# Patient Record
Sex: FEMALE | Race: BLACK OR AFRICAN AMERICAN | HISPANIC OR LATINO | URBAN - METROPOLITAN AREA
[De-identification: names, ages, dates, MRNs, and addresses within clinical notes are randomized per-mention and may not be internally consistent; named-entity substitution may affect disease eponyms.]

---

## 2023-09-19 PROBLEM — Z3A.39 39 WEEKS GESTATION OF PREGNANCY: Status: ACTIVE | Noted: 2023-08-14

## 2023-09-27 ENCOUNTER — ROUTINE PRENATAL (OUTPATIENT)
Dept: OBGYN CLINIC | Facility: CLINIC | Age: 23
End: 2023-09-27

## 2023-09-27 ENCOUNTER — HOSPITAL ENCOUNTER (INPATIENT)
Facility: HOSPITAL | Age: 23
LOS: 3 days | Discharge: HOME | End: 2023-09-30
Attending: OBSTETRICS & GYNECOLOGY | Admitting: OBSTETRICS & GYNECOLOGY
Payer: COMMERCIAL

## 2023-09-27 VITALS
WEIGHT: 146.8 LBS | HEART RATE: 106 BPM | SYSTOLIC BLOOD PRESSURE: 114 MMHG | BODY MASS INDEX: 25.2 KG/M2 | DIASTOLIC BLOOD PRESSURE: 77 MMHG

## 2023-09-27 DIAGNOSIS — O36.8130 DECREASED FETAL MOVEMENTS IN THIRD TRIMESTER, SINGLE OR UNSPECIFIED FETUS: ICD-10-CM

## 2023-09-27 DIAGNOSIS — Z3A.40 40 WEEKS GESTATION OF PREGNANCY: Primary | ICD-10-CM

## 2023-09-27 DIAGNOSIS — Z3A.40 40 WEEKS GESTATION OF PREGNANCY: ICD-10-CM

## 2023-09-27 DIAGNOSIS — Z34.93 PRENATAL CARE IN THIRD TRIMESTER: Primary | ICD-10-CM

## 2023-09-27 PROBLEM — Z34.90 ENCOUNTER FOR ELECTIVE INDUCTION OF LABOR: Status: ACTIVE | Noted: 2023-09-27

## 2023-09-27 LAB
ABO GROUP BLD: NORMAL
BLD GP AB SCN SERPL QL: NEGATIVE
ERYTHROCYTE [DISTWIDTH] IN BLOOD BY AUTOMATED COUNT: 14.6 % (ref 11.6–15.1)
HCT VFR BLD AUTO: 41.4 % (ref 34.8–46.1)
HGB BLD-MCNC: 13.4 G/DL (ref 11.5–15.4)
HOLD SPECIMEN: YES
MCH RBC QN AUTO: 28.8 PG (ref 26.8–34.3)
MCHC RBC AUTO-ENTMCNC: 32.4 G/DL (ref 31.4–37.4)
MCV RBC AUTO: 89 FL (ref 82–98)
PLATELET # BLD AUTO: 219 THOUSANDS/UL (ref 149–390)
PMV BLD AUTO: 11.5 FL (ref 8.9–12.7)
RBC # BLD AUTO: 4.66 MILLION/UL (ref 3.81–5.12)
RH BLD: POSITIVE
SPECIMEN EXPIRATION DATE: NORMAL
WBC # BLD AUTO: 7.62 THOUSAND/UL (ref 4.31–10.16)

## 2023-09-27 PROCEDURE — 99213 OFFICE O/P EST LOW 20 MIN: CPT | Performed by: OBSTETRICS & GYNECOLOGY

## 2023-09-27 PROCEDURE — 86900 BLOOD TYPING SEROLOGIC ABO: CPT

## 2023-09-27 PROCEDURE — 86850 RBC ANTIBODY SCREEN: CPT

## 2023-09-27 PROCEDURE — 86901 BLOOD TYPING SEROLOGIC RH(D): CPT

## 2023-09-27 PROCEDURE — 99202 OFFICE O/P NEW SF 15 MIN: CPT

## 2023-09-27 PROCEDURE — 85027 COMPLETE CBC AUTOMATED: CPT

## 2023-09-27 PROCEDURE — 86780 TREPONEMA PALLIDUM: CPT

## 2023-09-27 PROCEDURE — 4A1HXCZ MONITORING OF PRODUCTS OF CONCEPTION, CARDIAC RATE, EXTERNAL APPROACH: ICD-10-PCS | Performed by: OBSTETRICS & GYNECOLOGY

## 2023-09-27 PROCEDURE — NC001 PR NO CHARGE: Performed by: OBSTETRICS & GYNECOLOGY

## 2023-09-27 RX ORDER — MORPHINE SULFATE 10 MG/ML
INJECTION, SOLUTION INTRAMUSCULAR; INTRAVENOUS EVERY 4 HOURS PRN
Status: CANCELLED | OUTPATIENT
Start: 2023-09-27

## 2023-09-27 RX ORDER — SODIUM CHLORIDE, SODIUM LACTATE, POTASSIUM CHLORIDE, CALCIUM CHLORIDE 600; 310; 30; 20 MG/100ML; MG/100ML; MG/100ML; MG/100ML
125 INJECTION, SOLUTION INTRAVENOUS CONTINUOUS
Status: DISCONTINUED | OUTPATIENT
Start: 2023-09-27 | End: 2023-09-28

## 2023-09-27 RX ORDER — ONDANSETRON 2 MG/ML
4 INJECTION INTRAMUSCULAR; INTRAVENOUS EVERY 6 HOURS PRN
Status: DISCONTINUED | OUTPATIENT
Start: 2023-09-27 | End: 2023-09-28

## 2023-09-27 RX ORDER — OXYTOCIN/RINGER'S LACTATE 30/500 ML
1-30 PLASTIC BAG, INJECTION (ML) INTRAVENOUS
Status: DISCONTINUED | OUTPATIENT
Start: 2023-09-28 | End: 2023-09-28

## 2023-09-27 RX ORDER — BUPIVACAINE HYDROCHLORIDE 2.5 MG/ML
30 INJECTION, SOLUTION EPIDURAL; INFILTRATION; INTRACAUDAL ONCE AS NEEDED
Status: DISCONTINUED | OUTPATIENT
Start: 2023-09-27 | End: 2023-09-28

## 2023-09-27 RX ADMIN — Medication 25 MCG: at 21:15

## 2023-09-27 RX ADMIN — SODIUM CHLORIDE, SODIUM LACTATE, POTASSIUM CHLORIDE, AND CALCIUM CHLORIDE 999 ML/HR: .6; .31; .03; .02 INJECTION, SOLUTION INTRAVENOUS at 22:46

## 2023-09-27 RX ADMIN — MORPHINE SULFATE 2 MG: 2 INJECTION, SOLUTION INTRAMUSCULAR; INTRAVENOUS at 21:31

## 2023-09-27 NOTE — ASSESSMENT & PLAN NOTE
• Routine postpartum care  • Encourage ambulation  • Encourage familial bonding  • Lactation support as needed  • Pain: Motrin/Tylenol around the clock, oxycodone if needed  • Postpartum Contraceptive plan: declines at this time

## 2023-09-27 NOTE — H&P
H & P- Obstetrics   Allyson Brooks 25 y.o. female MRN: [id number]  Unit/Bed#: L&D 321-01 Encounter: [id number]    Assessment: 25 y.o.  at 40w2d admitted for induction of labor secondary to decreased fetal movement and post dates . SVE: 1.5/70/-3  FHT: reactive   EFW: 33%ile ; Vertex confirmed by US  GBS status: negative    Postpartum contraception plan: undecided  Plan:   * Encounter for elective induction of labor  Assessment & Plan  Complaint of decreased fetal movement , post date 40w2d  Admit for induction   IV Fluids  Clear liquid diet   Labs: CBC, RPR, Type and Screen   Analgesia: maternal request  Management: Dinero balloon and cytotec    40 weeks gestation of pregnancy  Assessment & Plan  Continuous fetal monitoring   EFW 2236 grams - 4 lbs 15 oz (33%)           Discussed case and plan w/ Dr. Dhaval Sharp       Chief Complaint: decreased fetal movement     HPI: Allyson Brooks is a 25 y.o. Ashley Johnny with an PATRICIA of 2023, by Ultrasound at 40w2d who is being admitted for induction of labor due to decreased fetal movement  . She denies having uterine contractions, has no LOF, and reports no VB. She states she has felt good FM. Roldan Lopez Patient Active Problem List   Diagnosis   • 40 weeks gestation of pregnancy   • Encounter for supervision of normal pregnancy in third trimester   • Prenatal care in third trimester   • Supervision of normal pregnancy   • Encounter for elective induction of labor       Baby complications/comments: none    Review of Systems   Constitutional: Negative for chills and fever. Eyes: Negative for photophobia and visual disturbance. Respiratory: Negative for cough. Cardiovascular: Negative for chest pain and leg swelling. Gastrointestinal: Negative for abdominal pain, diarrhea, nausea and vomiting. Genitourinary: Negative for dysuria, vaginal bleeding and vaginal discharge. Neurological: Positive for headaches. Negative for dizziness.        OB Hx:  OB History    Para Term  AB Living   2 0 0 0 1 0   SAB IAB Ectopic Multiple Live Births   0 0 0 0 0      # Outcome Date GA Lbr Moy/2nd Weight Sex Delivery Anes PTL Lv   2 Current            1 AB 2017 4w0d              Past Medical Hx:  No past medical history on file. Past Surgical hx:  No past surgical history on file. Social Hx:  Alcohol use: no  Tobacco use: no  Other substance use: no    Other: none    No Known Allergies    Medications Prior to Admission   Medication   • Prenatal Vit-Fe Fumarate-FA (prenatal vitamin) 28-0.8 mg       Objective:  HR:  [] 95  BP: (114-124)/(77-87) 124/87  There is no height or weight on file to calculate BMI. Physical Exam:  Chaperone present   Physical Exam  Constitutional:       Appearance: Normal appearance. Genitourinary:      Vulva normal.   HENT:      Head: Normocephalic and atraumatic. Eyes:      Extraocular Movements: Extraocular movements intact. Cardiovascular:      Rate and Rhythm: Normal rate and regular rhythm. Heart sounds: Normal heart sounds. No murmur heard. No friction rub. No gallop. Pulmonary:      Effort: Pulmonary effort is normal. No respiratory distress. Breath sounds: No wheezing or rhonchi. Abdominal:      General: There is no distension. Palpations: Abdomen is soft. Tenderness: There is no abdominal tenderness. There is no guarding. Musculoskeletal:         General: No swelling or tenderness. Normal range of motion. Cervical back: Normal range of motion. Neurological:      Mental Status: She is alert. Mental status is at baseline. Skin:     General: Skin is warm. Findings: No rash.    Psychiatric:         Mood and Affect: Mood normal.            FHT:  Baseline Rate: 120 bpm  FHR Category: Category I    TOCO:   Contraction Duration (seconds): 0  Contraction Quality: Mild, Not applicable    Lab Results   Component Value Date    WBC 7.35 08/15/2023    HGB 11.8 08/15/2023    HCT 35.7 08/15/2023     08/15/2023     No results found for: "NA", "K", "CL", "CO2", "BUN", "CREATININE", "GLUCOSE", "AST", "ALT"  Prenatal Labs: Reviewed      Blood type: O positive   Antibody: negative  GBS: negative   HIV: nonreactive   Rubella: Immune  VDRL/RPR: Non reactive  HBsAg: Negative  Chlamydia: Negative  Gonorrhea: Negative  Diabetes 1 hour screen: 105  3 hour glucose: NA  Platelets: 048   Hgb: 11.8  >2 Midnights  INPATIENT     Signature/Title: Evelyne Jiang MD  Date: 9/27/2023  Time: 7:52 PM

## 2023-09-27 NOTE — PROGRESS NOTES
[facility] [place]  Name: Leroy Cid  MRN: [id number]  : 2000      ASSESSMENT/PLAN:  Problem List        Other    40 weeks gestation of pregnancy    Overview     Late transfer of [facility] from [facility]   Prenatal labs wnl  EFW 8/15/23: 2236g 33%ile   GC/CT negative   Pap Negative   Contraception- unsure, "I am a single mother"   GBS collected 23  Rh pos: rhogam not indicated  Delivery plan: awaiting labor           Encounter for supervision of normal pregnancy in third trimester    Prenatal care in third trimester    Supervision of normal pregnancy   Other Visit Diagnoses     Decreased fetal movements in third trimester, single or unspecified fetus              SUBJECTIVE 25 y.o. Ruth Le @ 40w2d here for PN visit. She denies contractions. She denies leakage of fluid and vaginal bleeding. She states she has had decreased fetal  Movement throughout the day today. Her pregnancy is uncomplicated. OBJECTIVE:  Vitals:    23 1635   BP: 114/77   Pulse: (!) 106       FHT: 120s by doppler   SVE:  1.5/70/-3, membrane sweep performed    Advised patient to present to [facility] L&D for NST/CHINEDU and possible IOL. She agreed and expressed understanding.     Jayna Cash MD  OB/GYN PGY-2  2023  5:18 PM

## 2023-09-28 ENCOUNTER — ANESTHESIA EVENT (INPATIENT)
Dept: ANESTHESIOLOGY | Facility: HOSPITAL | Age: 23
End: 2023-09-28
Payer: COMMERCIAL

## 2023-09-28 ENCOUNTER — ANESTHESIA (INPATIENT)
Dept: ANESTHESIOLOGY | Facility: HOSPITAL | Age: 23
End: 2023-09-28
Payer: COMMERCIAL

## 2023-09-28 PROBLEM — O41.1290 CHORIOAMNIONITIS: Status: ACTIVE | Noted: 2023-09-28

## 2023-09-28 LAB
BASE EXCESS BLDCOA CALC-SCNC: -7 MMOL/L (ref 3–11)
BASE EXCESS BLDCOV CALC-SCNC: -4.4 MMOL/L (ref 1–9)
HCO3 BLDCOA-SCNC: 21.3 MMOL/L (ref 17.3–27.3)
HCO3 BLDCOV-SCNC: 22 MMOL/L (ref 12.2–28.6)
O2 CT VFR BLDCOA CALC: 9.7 ML/DL
OXYHGB MFR BLDCOA: 43.4 %
OXYHGB MFR BLDCOV: 54.1 %
PCO2 BLDCOA: 53.3 MM[HG] (ref 30–60)
PCO2 BLDCOV: 45 MM HG (ref 27–43)
PH BLDCOA: 7.22 [PH] (ref 7.23–7.43)
PH BLDCOV: 7.31 [PH] (ref 7.19–7.49)
PO2 BLDCOA: 20.6 MM HG (ref 5–25)
PO2 BLDCOV: 21.4 MM HG (ref 15–45)
SAO2 % BLDCOV: 11.6 ML/DL
TREPONEMA PALLIDUM IGG+IGM AB [PRESENCE] IN SERUM OR PLASMA BY IMMUNOASSAY: NORMAL

## 2023-09-28 PROCEDURE — 3E0P7VZ INTRODUCTION OF HORMONE INTO FEMALE REPRODUCTIVE, VIA NATURAL OR ARTIFICIAL OPENING: ICD-10-PCS | Performed by: OBSTETRICS & GYNECOLOGY

## 2023-09-28 PROCEDURE — 59409 OBSTETRICAL CARE: CPT | Performed by: OBSTETRICS & GYNECOLOGY

## 2023-09-28 PROCEDURE — 82805 BLOOD GASES W/O2 SATURATION: CPT | Performed by: OBSTETRICS & GYNECOLOGY

## 2023-09-28 PROCEDURE — 10907ZC DRAINAGE OF AMNIOTIC FLUID, THERAPEUTIC FROM PRODUCTS OF CONCEPTION, VIA NATURAL OR ARTIFICIAL OPENING: ICD-10-PCS | Performed by: OBSTETRICS & GYNECOLOGY

## 2023-09-28 RX ORDER — ROPIVACAINE HYDROCHLORIDE 2 MG/ML
INJECTION, SOLUTION EPIDURAL; INFILTRATION; PERINEURAL CONTINUOUS PRN
Status: DISCONTINUED | OUTPATIENT
Start: 2023-09-28 | End: 2023-09-28 | Stop reason: HOSPADM

## 2023-09-28 RX ORDER — SIMETHICONE 80 MG
80 TABLET,CHEWABLE ORAL EVERY 6 HOURS PRN
Status: DISCONTINUED | OUTPATIENT
Start: 2023-09-28 | End: 2023-09-30 | Stop reason: HOSPADM

## 2023-09-28 RX ORDER — LIDOCAINE HYDROCHLORIDE AND EPINEPHRINE 15; 5 MG/ML; UG/ML
INJECTION, SOLUTION EPIDURAL
Status: COMPLETED | OUTPATIENT
Start: 2023-09-28 | End: 2023-09-28

## 2023-09-28 RX ORDER — ROPIVACAINE HYDROCHLORIDE 2 MG/ML
INJECTION, SOLUTION EPIDURAL; INFILTRATION; PERINEURAL AS NEEDED
Status: DISCONTINUED | OUTPATIENT
Start: 2023-09-28 | End: 2023-09-28 | Stop reason: HOSPADM

## 2023-09-28 RX ORDER — CALCIUM CARBONATE 500 MG/1
1000 TABLET, CHEWABLE ORAL DAILY PRN
Status: DISCONTINUED | OUTPATIENT
Start: 2023-09-28 | End: 2023-09-30 | Stop reason: HOSPADM

## 2023-09-28 RX ORDER — CALCIUM CARBONATE 500 MG/1
500 TABLET, CHEWABLE ORAL DAILY PRN
Status: DISCONTINUED | OUTPATIENT
Start: 2023-09-28 | End: 2023-09-30 | Stop reason: HOSPADM

## 2023-09-28 RX ORDER — DIAPER,BRIEF,INFANT-TODD,DISP
1 EACH MISCELLANEOUS DAILY PRN
Status: DISCONTINUED | OUTPATIENT
Start: 2023-09-28 | End: 2023-09-30 | Stop reason: HOSPADM

## 2023-09-28 RX ORDER — DIPHENHYDRAMINE HCL 25 MG
25 TABLET ORAL EVERY 6 HOURS PRN
Status: DISCONTINUED | OUTPATIENT
Start: 2023-09-28 | End: 2023-09-30 | Stop reason: HOSPADM

## 2023-09-28 RX ORDER — SIMETHICONE 80 MG
80 TABLET,CHEWABLE ORAL 4 TIMES DAILY PRN
Status: DISCONTINUED | OUTPATIENT
Start: 2023-09-28 | End: 2023-09-30 | Stop reason: HOSPADM

## 2023-09-28 RX ORDER — IBUPROFEN 600 MG/1
600 TABLET ORAL EVERY 6 HOURS
Status: DISCONTINUED | OUTPATIENT
Start: 2023-09-28 | End: 2023-09-30 | Stop reason: HOSPADM

## 2023-09-28 RX ORDER — IBUPROFEN 600 MG/1
600 TABLET ORAL EVERY 6 HOURS PRN
Status: DISCONTINUED | OUTPATIENT
Start: 2023-09-28 | End: 2023-09-28 | Stop reason: SDUPTHER

## 2023-09-28 RX ORDER — SENNOSIDES 8.6 MG
1 TABLET ORAL DAILY
Status: DISCONTINUED | OUTPATIENT
Start: 2023-09-29 | End: 2023-09-30 | Stop reason: HOSPADM

## 2023-09-28 RX ORDER — ACETAMINOPHEN 325 MG/1
650 TABLET ORAL EVERY 4 HOURS PRN
Status: DISCONTINUED | OUTPATIENT
Start: 2023-09-28 | End: 2023-09-30 | Stop reason: HOSPADM

## 2023-09-28 RX ORDER — ACETAMINOPHEN 325 MG/1
975 TABLET ORAL EVERY 6 HOURS PRN
Status: DISCONTINUED | OUTPATIENT
Start: 2023-09-28 | End: 2023-09-28

## 2023-09-28 RX ORDER — ONDANSETRON 2 MG/ML
4 INJECTION INTRAMUSCULAR; INTRAVENOUS EVERY 8 HOURS PRN
Status: DISCONTINUED | OUTPATIENT
Start: 2023-09-28 | End: 2023-09-30 | Stop reason: HOSPADM

## 2023-09-28 RX ADMIN — WITCH HAZEL 1 PAD: 500 SOLUTION RECTAL; TOPICAL at 19:30

## 2023-09-28 RX ADMIN — ROPIVACAINE HYDROCHLORIDE 8 ML/HR: 2 INJECTION, SOLUTION EPIDURAL; INFILTRATION at 05:50

## 2023-09-28 RX ADMIN — SODIUM CHLORIDE 3 G: 9 INJECTION, SOLUTION INTRAVENOUS at 08:28

## 2023-09-28 RX ADMIN — ACETAMINOPHEN 325MG 975 MG: 325 TABLET ORAL at 07:57

## 2023-09-28 RX ADMIN — IBUPROFEN 600 MG: 600 TABLET ORAL at 18:19

## 2023-09-28 RX ADMIN — ROPIVACAINE HYDROCHLORIDE 4 ML: 2 INJECTION, SOLUTION EPIDURAL; INFILTRATION; PERINEURAL at 05:45

## 2023-09-28 RX ADMIN — SODIUM CHLORIDE, SODIUM LACTATE, POTASSIUM CHLORIDE, AND CALCIUM CHLORIDE 999 ML/HR: .6; .31; .03; .02 INJECTION, SOLUTION INTRAVENOUS at 05:45

## 2023-09-28 RX ADMIN — ACETAMINOPHEN 325MG 975 MG: 325 TABLET ORAL at 16:59

## 2023-09-28 RX ADMIN — SODIUM CHLORIDE 3 G: 9 INJECTION, SOLUTION INTRAVENOUS at 14:25

## 2023-09-28 RX ADMIN — SODIUM CHLORIDE 3 G: 9 INJECTION, SOLUTION INTRAVENOUS at 21:26

## 2023-09-28 RX ADMIN — ROPIVACAINE HYDROCHLORIDE: 2 INJECTION, SOLUTION EPIDURAL; INFILTRATION at 14:30

## 2023-09-28 RX ADMIN — LIDOCAINE HYDROCHLORIDE AND EPINEPHRINE 3 ML: 15; 5 INJECTION, SOLUTION EPIDURAL at 05:38

## 2023-09-28 RX ADMIN — BENZOCAINE AND LEVOMENTHOL 1 APPLICATION: 200; 5 SPRAY TOPICAL at 19:30

## 2023-09-28 RX ADMIN — Medication 2 MILLI-UNITS/MIN: at 01:37

## 2023-09-28 RX ADMIN — SODIUM CHLORIDE, SODIUM LACTATE, POTASSIUM CHLORIDE, AND CALCIUM CHLORIDE 125 ML/HR: .6; .31; .03; .02 INJECTION, SOLUTION INTRAVENOUS at 15:39

## 2023-09-28 RX ADMIN — ROPIVACAINE HYDROCHLORIDE 4 ML: 2 INJECTION, SOLUTION EPIDURAL; INFILTRATION; PERINEURAL at 05:41

## 2023-09-28 RX ADMIN — ROPIVACAINE HYDROCHLORIDE: 2 INJECTION, SOLUTION EPIDURAL; INFILTRATION at 05:50

## 2023-09-28 RX ADMIN — SODIUM CHLORIDE, SODIUM LACTATE, POTASSIUM CHLORIDE, AND CALCIUM CHLORIDE 125 ML/HR: .6; .31; .03; .02 INJECTION, SOLUTION INTRAVENOUS at 07:26

## 2023-09-28 RX ADMIN — SODIUM CHLORIDE, SODIUM LACTATE, POTASSIUM CHLORIDE, AND CALCIUM CHLORIDE 999 ML/HR: .6; .31; .03; .02 INJECTION, SOLUTION INTRAVENOUS at 02:07

## 2023-09-28 NOTE — OB LABOR/OXYTOCIN SAFETY PROGRESS
Labor Progress Note - Amor Flores 25 y.o. female MRN: [id number]    Unit/Bed#: L&D 321-01 Encounter: [id number]       Contraction Frequency (minutes): 2-4  Contraction Quality: Mild  Tachysystole: No   Cervical Dilation: 3-4        Cervical Effacement: 70  Fetal Station: -3  Baseline Rate: 120 bpm  Fetal Heart Rate: 135 BPM  FHR Category: CAT 1               Vital Signs:   Vitals:    09/27/23 2206   BP: 106/58   Pulse: 76   Resp:    Temp:        Notes/comments:  Rosette dislodged. SVE as above. Plan to start pitocin titration 4 hrs post cytotec.          Marta Meza MD 9/27/2023 11:57 PM

## 2023-09-28 NOTE — OB LABOR/OXYTOCIN SAFETY PROGRESS
Oxytocin Safety Progress Check Note - Jacky Booker 25 y.o. female MRN: [id number]    Unit/Bed#: L&D 321-01 Encounter: [id number]    Dose (kisha-units/min) Oxytocin: 8 kisha-units/min  Contraction Frequency (minutes): irregular  Contraction Quality: Mild  Tachysystole: No   Cervical Dilation: 4        Cervical Effacement: 70  Fetal Station: -3  Baseline Rate: 130 bpm  Fetal Heart Rate: 135 BPM  FHR Category: CAT I                Vital Signs:   Vitals:    09/28/23 0308   BP: 113/67   Pulse: 80   Resp:    Temp:        Notes/comments:SVE as above. Fetal head ballotable. Patient is unsure about epidural. Plan to continue pitocin titrations and recheck in 2 hours with plan to AROM if fetal head is well applied.          Claudia Aguirre MD 9/28/2023 4:28 AM

## 2023-09-28 NOTE — OB LABOR/OXYTOCIN SAFETY PROGRESS
Oxytocin Safety Progress Check Note - Otis Brown 25 y.o. female MRN: [id number]    Unit/Bed#: L&D 321-01 Encounter: [id number]          Contraction Quality: Mild, Not applicable      Cervical Dilation: 1-2        Cervical Effacement: 70  Fetal Station: -3  Baseline Rate: 120 bpm  Fetal Heart Rate: 132 BPM  FHR Category: CAT I                Vital Signs:   Vitals:    09/27/23 1900   BP: 124/87   Pulse: 95   Resp: 16   Temp: 97.7 °F (36.5 °C)       Notes/comments: PROCEDURE:  GAGNON BALLOON PLACEMENT    A 24F gagnon with a 30cc balloon was selected, a speculum examination was performed and the cervix was located. A gagnon balloon was introduced over sterile gloved hands. Balloon advanced through cervix with a ringed forceps beyond the internal cervical os. A small amount amount of sterile saline solution was instilled in the balloon to confirm placement. Placement was confirmed to be beyond the internal cervical os. A total of 60cc of sterile saline solution was placed into the balloon. Vaginal cytotec was placed. Pt tolerated well. Instructions left with RN to place gagnon to gravity with a 1L bag of IV fluid. Notify DO/MD when gagnon dislodged.     MD Nasreen Augustin MD 9/27/2023 9:19 PM

## 2023-09-28 NOTE — OB LABOR/OXYTOCIN SAFETY PROGRESS
Oxytocin Safety Progress Check Note - Imelda Enrique 25 y.o. female MRN: [id number]    Unit/Bed#: L&D 321-01 Encounter: [id number]    Dose (kisha-units/min) Oxytocin: 14 kisha-units/min  Contraction Frequency (minutes): 2-4  Contraction Quality: Moderate  Tachysystole: No   Cervical Dilation: 7        Cervical Effacement: 80  Fetal Station: -1  Baseline Rate: 140 bpm  Fetal Heart Rate: 135 BPM  FHR Category: cat 1               Vital Signs:   Vitals:    23 1200   BP: 112/55   Pulse: 84   Resp:    Temp:        Notes/comments:     SVE as above. No fevers, maternal or fetal tachycardia since starting fluids, tylenol and unasyn. Patient making change, pitocin at 16. Will continue to anticipate .      Placido Madrid DO 2023 12:30 PM

## 2023-09-28 NOTE — L&D DELIVERY NOTE
CHIEF COMPLAINT:   Patient presents with:  Sinus Problem    HPI:   Bel Elmore is a 46year old female who presents for sinus congestion for  2  weeks. Symptoms have been constant since onset.  Sinus congestion/pain is described as a pressure and is locat Vaginal Delivery Summary - OB/GYN   Leroy Mcgee 25 y.o. female MRN: [id number]  Unit/Bed#: L&D 321-01 Encounter: [id number]    Pre-delivery Diagnosis:   Pregnancy at 40w3d  Intrapartum Intraamniotic Infection    Post-delivery Diagnosis: same, delivered    Procedure: Spontaneous Vaginal Delivery     Attending Physician: Steve Monsalve MD  Resident Physician: Dr. Case Montes PGY-1      Anesthesia: Epidural    QBL: 152GE    Complications: none apparent    Specimens:   1. Arterial and venous cord gases  2. Cord blood  3. Segment of umbilical cord  4. Placenta to storage     Findings:  1. Viable female on 2023 at [phone], with APGARS of 9 and 9 at 1 and 5 minutes respectively. Weight pending at time of dictation for skin to skin bonding. 2. Spontaneous delivery of intact placenta at 1619  3. No lacerations    Gases:  Umbilical Artery  Recent Labs     23  1617   PHCART 7.220*   BECART -7.0*       Umbilical Vein  Recent Labs     23  1617   PHCVEN 7.307   BECVEN -4.4*           Brief history and labor course:  Leroy Mcgee is a 25 y.o. [id number] 40w3d . She presented to labor and delivery for decreased fetal movement and was kept for induction at term in the context of decreased fetal movement. Her pregnancy was uncomplicated. On exam in triage she was noted to be 1.5/70/-3. She was admitted for induction of labor. She was induced with a Dinero balloon and Cytotec. After the Dinero balloon came out she developed a maternal fever of 100.4, maternal tachycardia, and fetal tachycardia. Unasyn was started and Tylenol was given, while fluids were bolused. Her fever resolved as well as her tachycardia and fetal tachycardia. After this episode the strip remained category 1. Amniotomy was performed at [phone] for odorless, clear fluid. Description of delivery:    Patient delivered a viable female , wt pending as mother is doing skin to skin bonding. The fetal vertex delivered ELINA position spontaneously. /60 mmHg  Pulse 113  Temp(Src) 98.2 °F (36.8 °C) (Oral)  Resp 12  Ht 64\"  Wt 400 lb  BMI 68.63 kg/m2  SpO2 98%  GENERAL: well developed, well nourished,in no apparent distress  SKIN: no rashes,no suspicious lesions  HEAD: atraumatic, normocephalic, There was no nuchal cord. The left anterior shoulder delivered atraumatically with maternal expulsive forces and the assistance of gentle downward traction. The right posterior shoulder delivered with maternal expulsive forces and the assistance of gentle upward traction. The remainder of the fetus delivered spontaneously. Upon delivery, the infant was placed on the mothers abdomen and the cord was doubly clamped and cut. Delayed cord clamping was achieved. The infant was noted to cry spontaneously and was moving all extremities appropriately. There was no evidence for injury. Awaiting nurse resuscitators evaluated the . Arterial and venous cord blood gases and cord blood was collected for analysis. These were promptly sent to the lab. In the immediate post-partum, active management of the 3rd stage of labor was performed with massage, the administration of 30 units of IV pitocin, and gentle traction on the umbilical cord. The placenta delivered spontaneously and was noted to have a centrally inserted 3 vessel cord. The placenta was sent to storage     Laceration Repair  The vagina, cervix, perineum, and rectum were inspected and there was noted to be no lacerations. At the conclusion of the procedure, all needle, sponge, and instrument counts were noted to be correct. Dr. Antonio Geronimo was present and participated in all key portions of the case.     Disposition:  The patient and the  both tolerated the procedure well and are recovering in labor and delivery room       Helen Soliz DO  OB/GYN PGY-1  2023  4:36 PM · Return to clinic or follow up with PCP for further evaluation if symptoms are not improved within 48-72 hours  · Go to ER if facial or periorbital swelling develops  · Please take all of your antibiotic as prescribed or the infection will be treated inef Sinuses are air-filled spaces in the skull behind the face. They are kept moist and clean by a lining of mucosa. Things such as pollen, smoke, and chemical fumes can irritate the mucosa. It can then swell up.  As a response to irritation, the mucosa makes m © 3500-3619 [address], [address] [place]. All rights reserved. This information is not intended as a substitute for professional medical care. Always follow your healthcare professional's instructions.

## 2023-09-28 NOTE — OB LABOR/OXYTOCIN SAFETY PROGRESS
Oxytocin Safety Progress Check Note - Swapna Treadwell 25 y.o. female MRN: [id number]    Unit/Bed#: L&D 321-01 Encounter: [id number]    Dose (kisha-units/min) Oxytocin: 10 kisha-units/min  Contraction Frequency (minutes): 2-4  Contraction Quality: Mild  Tachysystole: No   Cervical Dilation: 4-5        Cervical Effacement: 70  Fetal Station: -1  Baseline Rate: 135 bpm  Fetal Heart Rate: 135 BPM  FHR Category: CAT II               Vital Signs:   Vitals:    09/28/23 0612   BP: 108/59   Pulse: (!) 106   Resp:    Temp:        Notes/comments: Patient comfortable on epidural. Hypotension noted. Recurrent variables with rebeca of 60bpm on FHT. Patient repositioned and given fluid bolus. Return to baseline noted. Plan to continue to monitor FHT . SVE unchanged. If CAT I maintained, consider AROm.          Trent Rogders MD 9/28/2023 6:37 AM

## 2023-09-28 NOTE — ANESTHESIA POSTPROCEDURE EVALUATION
Post-Op Assessment Note    CV Status:  Stable  Pain Score: 1    Pain management: adequate     Mental Status:  Alert and awake   Hydration Status:  Euvolemic   PONV Controlled:  Controlled   Airway Patency:  Patent      Post Op Vitals Reviewed: Yes      Staff: Anesthesiologist     Post-op block assessment: catheter intact and no complications      No notable events documented.     /61 (09/28/23 1715)    Temp     Pulse 104 (09/28/23 1715)   Resp      SpO2

## 2023-09-28 NOTE — ANESTHESIA PREPROCEDURE EVALUATION
Procedure:  LABOR ANALGESIA    Relevant Problems   GYN   (+) 40 weeks gestation of pregnancy   (+) Encounter for supervision of normal pregnancy in third trimester   (+) Supervision of normal pregnancy        Physical Exam    Airway    Mallampati score: II  TM Distance: >3 FB  Neck ROM: full     Dental   No notable dental hx     Cardiovascular  Cardiovascular exam normal    Pulmonary  Pulmonary exam normal     Other Findings        Anesthesia Plan  ASA Score- 2     Anesthesia Type- epidural with ASA Monitors. Additional Monitors:   Airway Plan:           Plan Factors-    Chart reviewed. Existing labs reviewed. Patient summary reviewed. Induction-     Postoperative Plan-     Informed Consent- Anesthetic plan and risks discussed with patient.

## 2023-09-28 NOTE — OB LABOR/OXYTOCIN SAFETY PROGRESS
Oxytocin Safety Progress Check Note - Leonel Briseno 25 y.o. female MRN: [id number]    Unit/Bed#: L&D 321-01 Encounter: [id number]    Dose (kisha-units/min) Oxytocin: 10 kisha-units/min  Contraction Frequency (minutes): 2-4  Contraction Quality: Moderate  Tachysystole: No   Cervical Dilation: 5        Cervical Effacement: 70  Fetal Station: -1  Baseline Rate: 175 bpm  Fetal Heart Rate: 135 BPM  FHR Category: cat 2 for fetal tachycardia, reassured by moderate variability and accelerations. Vital Signs:   Vitals:    09/28/23 0815   BP: 120/57   Pulse: (!) 107   Resp:    Temp:        Notes/comments:     FHT cat 1. AROMcl. Patient with maternal fever and fetal tachycardia, unasyn started, tylenol given, fluids bolused.      Lenora Asif DO 9/28/2023 8:37 AM

## 2023-09-28 NOTE — OB LABOR/OXYTOCIN SAFETY PROGRESS
Oxytocin Safety Progress Check Note - Swapna Treadwell 25 y.o. female MRN: [id number]    Unit/Bed#: L&D 321-01 Encounter: [id number]    Dose (kisha-units/min) Oxytocin: 16 kisha-units/min  Contraction Frequency (minutes): 2-4  Contraction Quality: Moderate  Tachysystole: No   Cervical Dilation: Lip/rim (Comment)        Cervical Effacement: 100  Fetal Station: 0  Baseline Rate: 135 bpm  Fetal Heart Rate: 135 BPM  FHR Category: cat 2 for early decelerations, reassured by moderate variability and accels present               Vital Signs:   Vitals:    23 1445   BP: 111/67   Pulse: 93   Resp:    Temp:        Notes/comments:      as above. Will position patient with peanut ball, and re-evaluate shortly.      Petra Quezada DO 2023 3:09 PM

## 2023-09-28 NOTE — OB LABOR/OXYTOCIN SAFETY PROGRESS
Oxytocin Safety Progress Check Note - Kareem Ports 25 y.o. female MRN: [id number]    Unit/Bed#: L&D 321-01 Encounter: [id number]    Dose (kisha-units/min) Oxytocin: 16 kisha-units/min  Contraction Frequency (minutes): 2-4  Contraction Quality: Moderate  Tachysystole: No   Cervical Dilation: 10  Dilation Complete Date: 09/28/23  Dilation Complete Time: 1555  Cervical Effacement: 100  Fetal Station: 2  Baseline Rate: 135 bpm  Fetal Heart Rate: 135 BPM  FHR Category: cat 1               Vital Signs:   Vitals:    09/28/23 1445   BP: 111/67   Pulse: 93   Resp:    Temp:        Notes/comments:     Patient is complete and +2. Will begin pushing.      Marlee Pinedo DO 9/28/2023 3:55 PM

## 2023-09-28 NOTE — ANESTHESIA PROCEDURE NOTES
Epidural Block    Patient location during procedure: OB/L&D  Start time: 9/28/2023 5:38 AM  Reason for block: primary anesthetic  Staffing  Performed by: Valeri Montes DO  Authorized by: Valeri Montes DO    Preanesthetic Checklist  Completed: patient identified, IV checked, risks and benefits discussed, surgical consent, monitors and equipment checked, pre-op evaluation and timeout performed  Epidural  Patient position: sitting  Prep: Betadine  Sedation Level: no sedation  Patient monitoring: frequent blood pressure checks  Approach: midline  Location: lumbar, L3-4  Injection technique: LATRICE saline  Needle  Needle type: Tuohy   Needle gauge: 18 G  Needle insertion depth: 6 cm  Catheter type: multi-orifice  Catheter size: 20 G  Catheter at skin depth: 10 cm  Catheter securement method: stabilization device and clear occlusive dressing  Test dose: negativelidocaine-epinephrine (XYLOCAINE-MPF/EPINEPHRINE) 1.5 %-1:200,000 injection 3 mL - Epidural   3 mL - 9/28/2023 5:38:00 AM  Assessment  Sensory level: T10  Number of attempts: 1negative aspiration for CSF, negative aspiration for heme and no paresthesia on injection  patient tolerated the procedure well with no immediate complications

## 2023-09-28 NOTE — DISCHARGE SUMMARY
Discharge Summary - Olga Kimball 25 y.o. female MRN: [id number]    Unit/Bed#: L&D 321-01 Encounter: [id number]    Admission Date: 2023     Discharge Date: 23    Problem List:      Chorioamnionitis       OBGYN Practice: [facility] Course:   Olga Kimball is a 25 y.o. [id number] 40w3d . She presented to labor and delivery for decreased fetal movement and was kept for induction at term in the context of decreased fetal movement. Her pregnancy was uncomplicated. On exam in triage she was noted to be 1.5/70/-3. She was admitted for induction of labor. She was induced with a Dinero balloon and Cytotec. After the Dinero balloon came out she developed a maternal fever of 100.4, maternal tachycardia, and fetal tachycardia. Unasyn was started and Tylenol was given, while fluids were bolused. Her fever resolved as well as her tachycardia and fetal tachycardia. After this episode the strip remained category 1. Amniotomy was performed at [phone] for odorless, clear fluid.         Delivery Findings:  Reema delivered a viable female  on 2023  4:14 PM  via Vaginal, Spontaneous  . The delivery was uncomplicated    Baby's Weight: 2930 g (6 lb 7.4 oz)   Apgar scores: 9  and 9  at 1 and 5 minutes, respectively  Anesthesia: Epidural ,   QBL: Non-Surgical QBL (mL): 139         was transferred to  nursery. Patient tolerated the procedure well and was transferred to recovery in stable condition. Her post-partum course was uncomplicated. Her post-partum pain was well controlled with oral analgesics. On day of discharge she was ambulating, voiding spontaneously, tolerating oral intake and hemodynamically stable. Mom's blood type is O positive and  Rhogam was not given. She was discharged home on postpartum day #2 without complications.  Patient was instructed to follow up with her OB as an outpatient and was given appropriate warnings to call doctor or provider if she develops signs of infection or uncontrolled pain. Disposition: Home    Planned Readmission: No    Discharge Medications:   Please see AVS    Discharge instructions :   -Do not place anything (no partner, tampons or douche) in your vagina for 6 weeks. -You may walk for exercise for the first 6 weeks then gradually return to your usual activities.   -Please do not drive for 1 week if you have no stitches and for 2 weeks if you have stitches or underwent a  delivery.    -You may take baths or shower per your preference.   -Please look at your bust (breasts) in the mirror daily and call your doctor for redness or tenderness or increased warmth. - If you have had a  section please look at your incision daily as well and call provider for increasing redness or steady drainage from the incision.   -Please call your doctor's office if temperature > 100.4*F or 38* C, worsening pain or a foul discharge.     Follow Up:  - Follow up in 3 weeks for postpartum visit    Monica Rivero MD  Obstetrics & Gynecology PGY-3  2023  6:44 AM

## 2023-09-29 PROCEDURE — 99024 POSTOP FOLLOW-UP VISIT: CPT | Performed by: OBSTETRICS & GYNECOLOGY

## 2023-09-29 RX ADMIN — IBUPROFEN 600 MG: 600 TABLET ORAL at 06:40

## 2023-09-29 RX ADMIN — ACETAMINOPHEN 325MG 650 MG: 325 TABLET ORAL at 03:02

## 2023-09-29 RX ADMIN — IBUPROFEN 600 MG: 600 TABLET ORAL at 00:19

## 2023-09-29 RX ADMIN — IBUPROFEN 600 MG: 600 TABLET ORAL at 22:29

## 2023-09-29 NOTE — PLAN OF CARE
Problem: PAIN - ADULT  Goal: Verbalizes/displays adequate comfort level or baseline comfort level  Description: Interventions:  - Encourage patient to monitor pain and request assistance  - Assess pain using appropriate pain scale  - Administer analgesics based on type and severity of pain and evaluate response  - Implement non-pharmacological measures as appropriate and evaluate response  - Consider cultural and social influences on pain and pain management  - Notify physician/advanced practitioner if interventions unsuccessful or patient reports new pain  Outcome: Progressing     Problem: INFECTION - ADULT  Goal: Absence or prevention of progression during hospitalization  Description: INTERVENTIONS:  - Assess and monitor for signs and symptoms of infection  - Monitor lab/diagnostic results  - Monitor all insertion sites, i.e. indwelling lines, tubes, and drains  - Monitor endotracheal if appropriate and nasal secretions for changes in amount and color  - [place] appropriate cooling/warming therapies per order  - Administer medications as ordered  - Instruct and encourage patient and family to use good hand hygiene technique  - Identify and instruct in appropriate isolation precautions for identified infection/condition  Outcome: Progressing  Goal: Absence of fever/infection during neutropenic period  Description: INTERVENTIONS:  - Monitor WBC    Outcome: Progressing     Problem: SAFETY ADULT  Goal: Patient will remain free of falls  Description: INTERVENTIONS:  - Educate patient/family on patient safety including physical limitations  - Instruct patient to call for assistance with activity   - Consult OT/PT to assist with strengthening/mobility   - Keep Call bell within reach  - Keep bed low and locked with side rails adjusted as appropriate  - Keep care items and personal belongings within reach  - Initiate and maintain comfort rounds  - Make Fall Risk Sign visible to staff  - Apply yellow socks and bracelet for high fall risk patients  - Consider moving patient to room near nurses station  Outcome: Progressing  Goal: Maintain or return to baseline ADL function  Description: INTERVENTIONS:  -  Assess patient's ability to carry out ADLs; assess patient's baseline for ADL function and identify physical deficits which impact ability to perform ADLs (bathing, care of mouth/teeth, toileting, grooming, dressing, etc.)  - Assess/evaluate cause of self-care deficits   - Assess range of motion  - Assess patient's mobility; develop plan if impaired  - Assess patient's need for assistive devices and provide as appropriate  - Encourage maximum independence but intervene and supervise when necessary  - Involve family in performance of ADLs  - Assess for home care needs following discharge   - Consider OT consult to assist with ADL evaluation and planning for discharge  - Provide patient education as appropriate  Outcome: Progressing  Goal: Maintains/Returns to pre admission functional level  Description: INTERVENTIONS:  - Perform BMAT or MOVE assessment daily.   - Set and communicate daily mobility goal to care team and patient/family/caregiver. - Collaborate with rehabilitation services on mobility goals if consulted  - Out of bed for toileting  - Record patient progress and toleration of activity level   Outcome: Progressing     Problem: Knowledge Deficit  Goal: Patient/family/caregiver demonstrates understanding of disease process, treatment plan, medications, and discharge instructions  Description: Complete learning assessment and assess knowledge base.   Interventions:  - Provide teaching at level of understanding  - Provide teaching via preferred learning methods  Outcome: Progressing     Problem: DISCHARGE PLANNING  Goal: Discharge to home or other facility with appropriate resources  Description: INTERVENTIONS:  - Identify barriers to discharge w/patient and caregiver  - Arrange for needed discharge resources and transportation as appropriate  - Identify discharge learning needs (meds, wound care, etc.)  - Arrange for interpretive services to assist at discharge as needed  - Refer to Case Management Department for coordinating discharge planning if the patient needs post-hospital services based on physician/advanced practitioner order or complex needs related to functional status, cognitive ability, or social support system  Outcome: Progressing

## 2023-09-29 NOTE — CASE MANAGEMENT
CM consulted for recent moved from [facility] to care. CM m/w MOB to complete an assessment. Interpretor [id number]    MOB is Socorro Rachel  BERT is not involved and lives in [facility]. Confirmed address: [facility] [address] [facility], [address] [place] [address]    Confirmed telephone numbers: [phone]      Lives with: Anthony Rodríguez ([phone])  Support system: Aunt  Supplies: Has car seat, crib, clothes. MOB requested assistance with diapers and formula. CM provided a resource list for those items. MOB stated she can also reach out to her mother for assistance. Feeding: Breast and supplement with formula  Government assistance: [facility]   Childcare: Home with MOB  Work/school: Currently unemployed. Recently moved here from [facility]. Aunt and mother are financially assisting MOB. Rides/transport: sulema  Pediatrician: Julio  Prenatal Care: Limited due to recent move from [facility]. Mental Health: Denies  Drug History: Denies  Legal issues: Denies    MOB is uninsured. CM made a referral to [facility]. MOB is MA pending. There are no concerns identified at this time.

## 2023-09-29 NOTE — PROGRESS NOTES
Progress Note - OB/GYN  Amor Flores 25 y.o. female MRN: [id number]  Unit/Bed#: L&D 307-01 Encounter: [id number]    Assessment and Plan     Amor Flores is a patient of: [address] [place] [address]. She is PPD# 1 s/p  spontaneous vaginal delivery  Recovering well and is stable       *  (spontaneous vaginal delivery)  Assessment & Plan  • Routine postpartum care  • Encourage ambulation  • Encourage familial bonding  • Lactation support as needed  • Pain: Motrin/Tylenol around the clock, oxycodone if needed  • Postpartum Contraceptive plan: declines at this time          Chorioamnionitis  Assessment & Plan  Tmax: 100.9; with maternal and fetal tachycardia  -Given 3 total doses of Unasyn with resolution of maternal and fetal tachycardia and elevated temperatures  -Fluids, Tylenol given  -Last elevated temp on  @1622 -- afebrile since  -No subjective fevers/chills    40 weeks gestation of pregnancy  Assessment & Plan  Continuous fetal monitoring   EFW 2236 grams - 4 lbs 15 oz (33%)         Disposition    - Anticipate discharge home on PPD# 1-2      Subjective/Objective     Chief Complaint: Postpartum State     Subjective:    Amor Flores is PPD/POD#1 s/p  spontaneous vaginal delivery. She has no current complaints. Pain is well controlled. Patient is currently voiding. She is ambulating. Patient is not currently passing flatus and has had no bowel movement. She is tolerating PO, and denies nausea or vomitting. Patient denies fever, chills, chest pain, shortness of breath, or calf tenderness. Lochia is normal. She is  Breastfeeding and Bottle feeding. She is recovering well and is stable.        Vitals:   /64 (BP Location: Right arm)   Pulse 75   Temp 97.9 °F (36.6 °C) (Temporal)   Resp 16   Ht 5' 4" (1.626 m)   Wt 66.2 kg (146 lb)   SpO2 100%   Breastfeeding Yes   BMI 25.06 kg/m²       Intake/Output Summary (Last 24 hours) at 2023  Last data filed at 2023 2220  Gross per 24 hour   Intake --   Output 1939 ml   Net -1939 ml       Invasive Devices     Peripheral Intravenous Line  Duration           Peripheral IV 09/27/23 Right Forearm 1 day                Physical Exam:   GEN: Tre Haddad appears well, alert and oriented x 3, pleasant and cooperative   CARDIO: RRR, no murmurs or rubs  RESP:  CTAB, no wheezes or rales  ABDOMEN: soft, no tenderness, no distention, fundus @ -1  EXTREMITIES: SCDs on, non tender, no erythema, b/l Maria Esther's sign negative      Labs:     Hemoglobin   Date Value Ref Range Status   09/27/2023 13.4 11.5 - 15.4 g/dL Final   08/15/2023 11.8 11.5 - 15.4 g/dL Final     WBC   Date Value Ref Range Status   09/27/2023 7.62 4.31 - 10.16 Thousand/uL Final   08/15/2023 7.35 4.31 - 10.16 Thousand/uL Final     Platelets   Date Value Ref Range Status   09/27/2023 219 149 - 390 Thousands/uL Final   08/15/2023 224 149 - 390 Thousands/uL Final          Mina Umana DO  9/29/2023  6:43 AM

## 2023-09-29 NOTE — LACTATION NOTE
This note was copied from a baby's chart. CONSULT - LACTATION  Baby Girl (Ivette) [address] [facility] 1 days female MRN: [id number]    [facility] Room / Bed: L&D 307(N)/L&D 307(N) Encounter: [id number]    Maternal Information     MOTHER:  Reema Rodarte  Maternal Age: 25 y.o.   OB History: # 1 - Date: , Sex: None, Weight: None, GA: 4w0d, Delivery: None, Apgar1: None, Apgar5: None, Living: None, Birth Comments: None    # 2 - Date: 23, Sex: Female, Weight: 2930 g (6 lb 7.4 oz), GA: 40w3d, Delivery: Vaginal, Spontaneous, Apgar1: 9, Apgar5: 9, Living: Living, Birth Comments: None   Previouse breast reduction surgery? No    Lactation history:   Has patient previously breast fed: No   How long had patient previously breast fed:     Previous breast feeding complications:     History reviewed. No pertinent surgical history. Birth information:  YOB: 2023   Time of birth: 4:14 PM   Sex: female   Delivery type: Vaginal, Spontaneous   Birth Weight: 2930 g (6 lb 7.4 oz)   Percent of Weight Change: -1%     Gestational Age: 38w1d   [unfilled]    Assessment     Breast and nipple assessment: normal assessment     Assessment: sleepy    Feeding assessment: Encouraged to call for Lactation support for nursing at breast     LATCH:  Latch: Repeated attempts, hold nipple in mouth, stimulate to suck   Audible Swallowing: A few with stimulation   Type of Nipple: Everted (After stimulation)   Comfort (Breast/Nipple): Soft/non-tender   Hold (Positioning): Partial assist, teach one side, mother does other, staff holds   [facility] Score: 7          Feeding recommendations:  breast feed on demand     Met with mother to go over Ready, Set Baby and discharge breastfeeding booklet in [place] including the feeding log.  Emphasized 8 or more (12) feedings in a 24 hour period, what to expect for the number of diapers per day of life and the progression of properties of the  stooling pattern. List of reasons to call a lactation consultant. Feeding logs  Feeding cues  Hand expression  Baby's Second day (cluster feeding)  Breastfeeding and Your Lifestyle (Medications, Alcohol, Caffeine, Smoking, Street Drugs, Methadone)  First Two Weeks Survival Guide for Breastfeeding  Breast Changes  Physical Therapy  Storage and Handling of Breast milk  How to Keep Your Breast Pump Kit Clean  The Employed Breastfeeding Mother  Mixed feeding  Bottle feeding like breastfeeding (paced bottle feeding)  astfeeding and your lifestyle, storage and preparation of breast milk, how to keep you breast pump clean, the employed breastfeeding mother and paced bottle feeding handouts. Booklet included Breastfeeding Resources for after discharge including access to the number for the [address]. Met with mother to go over discharge breastfeeding booklet including the feeding log. Emphasized 8 or more (12) feedings in a 24 hour period, what to expect for the number of diapers per day of life and the progression of properties of the  stooling pattern. List of reasons to call a lactation consultant. Feeding logs  Feeding cues  Hand expression  Baby's Second day (cluster feeding)  Breastfeeding and Your Lifestyle (Medications, Alcohol, Caffeine, Smoking, Street Drugs, Methadone)  First Two Weeks Survival Guide for Breastfeeding  Breast Changes  Physical Therapy  Storage and Handling of Breast milk  How to Keep Your Breast Pump Kit Clean  The Employed Breastfeeding Mother  Mixed feeding  Bottle feeding like breastfeeding (paced bottle feeding)  astfeeding and your lifestyle, storage and preparation of breast milk, how to keep you breast pump clean, the employed breastfeeding mother and paced bottle feeding handouts. Booklet included Breastfeeding Resources for after discharge including access to the number for the [address]. Family support at bedside.     [employer] MOB  to call for assistance, questions and concerns. Extension number for inpatient lactation support provided.         Ana He RN 9/29/2023 2:48 PM

## 2023-09-29 NOTE — ASSESSMENT & PLAN NOTE
Tmax: 100.9; with maternal and fetal tachycardia  - s/p 3 total doses of Unasyn with resolution of maternal and fetal tachycardia and elevated temperatures  -Last elevated temp on 9/28 @1622 -- afebrile since  -No subjective fevers/chills

## 2023-09-29 NOTE — PLAN OF CARE
Problem: PAIN - ADULT  Goal: Verbalizes/displays adequate comfort level or baseline comfort level  Description: Interventions:  - Encourage patient to monitor pain and request assistance  - Assess pain using appropriate pain scale  - Administer analgesics based on type and severity of pain and evaluate response  - Implement non-pharmacological measures as appropriate and evaluate response  - Consider cultural and social influences on pain and pain management  - Notify physician/advanced practitioner if interventions unsuccessful or patient reports new pain  Outcome: Progressing     Problem: Knowledge Deficit  Goal: Patient/family/caregiver demonstrates understanding of disease process, treatment plan, medications, and discharge instructions  Description: Complete learning assessment and assess knowledge base.   Interventions:  - Provide teaching at level of understanding  - Provide teaching via preferred learning methods  Outcome: Progressing     Problem: DISCHARGE PLANNING  Goal: Discharge to home or other facility with appropriate resources  Description: INTERVENTIONS:  - Identify barriers to discharge w/patient and caregiver  - Arrange for needed discharge resources and transportation as appropriate  - Identify discharge learning needs (meds, wound care, etc.)  - Arrange for interpretive services to assist at discharge as needed  - Refer to Case Management Department for coordinating discharge planning if the patient needs post-hospital services based on physician/advanced practitioner order or complex needs related to functional status, cognitive ability, or social support system  Outcome: Progressing

## 2023-09-29 NOTE — NURSING NOTE
Maternal/ discharge teaching complete. Pt verbalized understanding and denies any questions at this time. Pt encouraged to call for nursing staff if any questions come to mind prior to discharge. Save your life magnet and education packet given and reviewed.  [id number].

## 2023-09-30 VITALS
WEIGHT: 146 LBS | HEIGHT: 64 IN | OXYGEN SATURATION: 100 % | RESPIRATION RATE: 18 BRPM | TEMPERATURE: 98.3 F | HEART RATE: 73 BPM | SYSTOLIC BLOOD PRESSURE: 117 MMHG | BODY MASS INDEX: 24.92 KG/M2 | DIASTOLIC BLOOD PRESSURE: 63 MMHG

## 2023-09-30 PROCEDURE — NC001 PR NO CHARGE: Performed by: OBSTETRICS & GYNECOLOGY

## 2023-09-30 PROCEDURE — 99024 POSTOP FOLLOW-UP VISIT: CPT | Performed by: OBSTETRICS & GYNECOLOGY

## 2023-09-30 RX ORDER — ACETAMINOPHEN 325 MG/1
650 TABLET ORAL EVERY 4 HOURS PRN
Qty: 30 TABLET | Refills: 0 | Status: SHIPPED | OUTPATIENT
Start: 2023-09-30 | End: 2023-10-30

## 2023-09-30 RX ORDER — IBUPROFEN 600 MG/1
600 TABLET ORAL EVERY 6 HOURS
Qty: 30 TABLET | Refills: 0 | Status: SHIPPED | OUTPATIENT
Start: 2023-09-30

## 2023-09-30 RX ORDER — POLYETHYLENE GLYCOL 3350 17 G/17G
17 POWDER, FOR SOLUTION ORAL DAILY
Qty: 10 EACH | Refills: 0 | Status: SHIPPED | OUTPATIENT
Start: 2023-09-30

## 2023-09-30 RX ADMIN — SENNOSIDES 8.6 MG: 8.6 TABLET, FILM COATED ORAL at 08:05

## 2023-09-30 RX ADMIN — IBUPROFEN 600 MG: 600 TABLET ORAL at 07:22

## 2023-09-30 RX ADMIN — IBUPROFEN 600 MG: 600 TABLET ORAL at 14:40

## 2023-09-30 NOTE — PLAN OF CARE
Problem: PAIN - ADULT  Goal: Verbalizes/displays adequate comfort level or baseline comfort level  Description: Interventions:  - Encourage patient to monitor pain and request assistance  - Assess pain using appropriate pain scale  - Administer analgesics based on type and severity of pain and evaluate response  - Implement non-pharmacological measures as appropriate and evaluate response  - Consider cultural and social influences on pain and pain management  - Notify physician/advanced practitioner if interventions unsuccessful or patient reports new pain  Outcome: Progressing     Problem: INFECTION - ADULT  Goal: Absence or prevention of progression during hospitalization  Description: INTERVENTIONS:  - Assess and monitor for signs and symptoms of infection  - Monitor lab/diagnostic results  - Monitor all insertion sites, i.e. indwelling lines, tubes, and drains  - Monitor endotracheal if appropriate and nasal secretions for changes in amount and color  - [place] appropriate cooling/warming therapies per order  - Administer medications as ordered  - Instruct and encourage patient and family to use good hand hygiene technique  - Identify and instruct in appropriate isolation precautions for identified infection/condition  Outcome: Progressing  Goal: Absence of fever/infection during neutropenic period  Description: INTERVENTIONS:  - Monitor WBC    Outcome: Progressing     Problem: SAFETY ADULT  Goal: Patient will remain free of falls  Description: INTERVENTIONS:  - Educate patient/family on patient safety including physical limitations  - Instruct patient to call for assistance with activity   - Consult OT/PT to assist with strengthening/mobility   - Keep Call bell within reach  - Keep bed low and locked with side rails adjusted as appropriate  - Keep care items and personal belongings within reach  - Initiate and maintain comfort rounds  - Make Fall Risk Sign visible to staff  - Apply yellow socks and bracelet for high fall risk patients  - Consider moving patient to room near nurses station  Outcome: Progressing  Goal: Maintain or return to baseline ADL function  Description: INTERVENTIONS:  -  Assess patient's ability to carry out ADLs; assess patient's baseline for ADL function and identify physical deficits which impact ability to perform ADLs (bathing, care of mouth/teeth, toileting, grooming, dressing, etc.)  - Assess/evaluate cause of self-care deficits   - Assess range of motion  - Assess patient's mobility; develop plan if impaired  - Assess patient's need for assistive devices and provide as appropriate  - Encourage maximum independence but intervene and supervise when necessary  - Involve family in performance of ADLs  - Assess for home care needs following discharge   - Consider OT consult to assist with ADL evaluation and planning for discharge  - Provide patient education as appropriate  Outcome: Progressing  Goal: Maintains/Returns to pre admission functional level  Description: INTERVENTIONS:  - Perform BMAT or MOVE assessment daily.   - Set and communicate daily mobility goal to care team and patient/family/caregiver. - Collaborate with rehabilitation services on mobility goals if consulted  - Out of bed for toileting  - Record patient progress and toleration of activity level   Outcome: Progressing     Problem: Knowledge Deficit  Goal: Patient/family/caregiver demonstrates understanding of disease process, treatment plan, medications, and discharge instructions  Description: Complete learning assessment and assess knowledge base.   Interventions:  - Provide teaching at level of understanding  - Provide teaching via preferred learning methods  Outcome: Progressing     Problem: DISCHARGE PLANNING  Goal: Discharge to home or other facility with appropriate resources  Description: INTERVENTIONS:  - Identify barriers to discharge w/patient and caregiver  - Arrange for needed discharge resources and transportation as appropriate  - Identify discharge learning needs (meds, wound care, etc.)  - Arrange for interpretive services to assist at discharge as needed  - Refer to Case Management Department for coordinating discharge planning if the patient needs post-hospital services based on physician/advanced practitioner order or complex needs related to functional status, cognitive ability, or social support system  Outcome: Progressing

## 2023-09-30 NOTE — PROGRESS NOTES
Progress Note - OB/GYN  Naman Rowe 25 y.o. female MRN: [id number]  Unit/Bed#: L&D 307-01 Encounter: [id number]    Assessment and Plan     Naman Rowe is a patient of: [address] [place] [address]. She is PPD# 2 s/p Spontaneous vaginal delivery. Recovering well and is stable       Chorioamnionitis  Assessment & Plan  Tmax: 100.9; with maternal and fetal tachycardia  - s/p 3 total doses of Unasyn with resolution of maternal and fetal tachycardia and elevated temperatures  -Last elevated temp on  @1622 -- afebrile since  -No subjective fevers/chills    *  (spontaneous vaginal delivery)  Assessment & Plan  • Routine postpartum care  • Encourage ambulation  • Encourage familial bonding  • Lactation support as needed  • Pain: Motrin/Tylenol around the clock, oxycodone if needed  • Postpartum Contraceptive plan: declines at this time            Disposition    - Anticipate discharge home on PPD # 2      Subjective/Objective     Chief Complaint: Postpartum State     Subjective:    Naman Rowe is PPD#2 s/p Spontaneous vaginal delivery. She has no current complaints. Pain is well controlled. Patient is currently voiding. She is ambulating. Patient is currently passing flatus and has had no bowel movement. She is tolerating PO, and denies nausea or vomitting. Patient denies fever, chills, chest pain, shortness of breath, or calf tenderness. Lochia is normal. She is  Breastfeeding. She is recovering well and is stable.        Vitals:   /85 (BP Location: Left arm)   Pulse 84   Temp (!) 97.2 °F (36.2 °C) (Temporal)   Resp 12   Ht 5' 4" (1.626 m)   Wt 66.2 kg (146 lb)   SpO2 100%   Breastfeeding Yes   BMI 25.06 kg/m²     No intake or output data in the 24 hours ending 23 0643    Invasive Devices     Peripheral Intravenous Line  Duration           Peripheral IV 23 Right Forearm 2 days                Physical Exam:   GEN: Naman Rowe appears well, alert and oriented x 3, pleasant and cooperative   CARDIO: Regular rate  RESP:  Normal effort   ABDOMEN: soft, no tenderness, no distention, fundus @ 1 below the umbillicus  EXTREMITIES: non-tender      Labs:     Hemoglobin   Date Value Ref Range Status   09/27/2023 13.4 11.5 - 15.4 g/dL Final   08/15/2023 11.8 11.5 - 15.4 g/dL Final     WBC   Date Value Ref Range Status   09/27/2023 7.62 4.31 - 10.16 Thousand/uL Final   08/15/2023 7.35 4.31 - 10.16 Thousand/uL Final     Platelets   Date Value Ref Range Status   09/27/2023 219 149 - 390 Thousands/uL Final   08/15/2023 224 149 - 390 Thousands/uL Final          Jose Daniel Buck MD  9/30/2023  6:43 AM

## 2023-09-30 NOTE — LACTATION NOTE
This note was copied from a baby's chart. New Mother verbalized breastfeeding is going well today. Denies need for assistance OR supplies. States information yesterday worked. Encouraged to call for latch assistance to assess how baby is doing. RN staff is translating today. Enc.to call for assistance as needed,phone # given. No family at bedside.

## 2023-10-06 LAB — PLACENTA IN STORAGE: NORMAL

## 2023-10-19 ENCOUNTER — TELEPHONE (OUTPATIENT)
Dept: OBGYN CLINIC | Facility: CLINIC | Age: 23
End: 2023-10-19

## 2023-10-31 ENCOUNTER — POSTPARTUM VISIT (OUTPATIENT)
Dept: OBGYN CLINIC | Facility: CLINIC | Age: 23
End: 2023-10-31

## 2023-10-31 VITALS
HEIGHT: 64 IN | WEIGHT: 125.6 LBS | HEART RATE: 74 BPM | BODY MASS INDEX: 21.44 KG/M2 | SYSTOLIC BLOOD PRESSURE: 107 MMHG | DIASTOLIC BLOOD PRESSURE: 70 MMHG

## 2023-10-31 PROBLEM — Z34.90 SUPERVISION OF NORMAL PREGNANCY: Status: RESOLVED | Noted: 2023-08-15 | Resolved: 2023-10-31

## 2023-10-31 PROBLEM — Z34.93 ENCOUNTER FOR SUPERVISION OF NORMAL PREGNANCY IN THIRD TRIMESTER: Status: RESOLVED | Noted: 2023-08-14 | Resolved: 2023-10-31

## 2023-10-31 PROBLEM — Z3A.40 40 WEEKS GESTATION OF PREGNANCY: Status: RESOLVED | Noted: 2023-08-14 | Resolved: 2023-10-31

## 2023-10-31 PROBLEM — Z34.93 PRENATAL CARE IN THIRD TRIMESTER: Status: RESOLVED | Noted: 2023-08-15 | Resolved: 2023-10-31

## 2023-10-31 LAB
SL AMB  POCT GLUCOSE, UA: NEGATIVE
SL AMB LEUKOCYTE ESTERASE,UA: POSITIVE
SL AMB POCT BILIRUBIN,UA: NEGATIVE
SL AMB POCT BLOOD,UA: NEGATIVE
SL AMB POCT CLARITY,UA: NORMAL
SL AMB POCT COLOR,UA: YELLOW
SL AMB POCT KETONES,UA: NORMAL
SL AMB POCT NITRITE,UA: NEGATIVE
SL AMB POCT PH,UA: POSITIVE
SL AMB POCT SPECIFIC GRAVITY,UA: 1.01
SL AMB POCT URINE PROTEIN: 5
SL AMB POCT UROBILINOGEN: NEGATIVE

## 2023-10-31 NOTE — PROGRESS NOTES
OB POSTPARTUM VISIT PROGRESS NOTE  Date of Encounter: 10/31/2023    Angie Breaux    : 2000  (21 y.o.)  MR: [id number]    Edvin Atkins is in for her postpartum visit. She is now . She delivered by normal spontaneous vaginal delivery. She's generally doing well, denies current pain or bleeding issues, and has no significant depression issues. Adrian score was 2, pt is assisted by her mother when she can. She is breast feeding with some supplementation. Discussed ways to increase milk supply We discussed all appropriate contraceptive options and she chooses . to abstain from intercourse, FOB is not involved  Denies problems with BM's, states she notes intermittent burning with urination  Aware exercise and intercourse should not resume until the baby is 7 weeks old    Explained urine dip was negative, advised to increase water intake, call with continued symptoms      LABS:  Last WNL PAP was 2023    Objective   EXAM:  GENERAL: alert, well appearing, and in no distress. VITALS: Blood pressure 107/70, pulse 74, height 5' 4" (1.626 m), weight 57 kg (125 lb 9.6 oz), last menstrual period 2023, currently breastfeeding. BMI: Body mass index is 21.56 kg/m². WNL respiratory effort, negative cough or SOB   BREASTS: Denies pain, redness or lumps:   EXTREMITIES: Denies pain, rednessor edema    Assessment/Plan   Diagnoses and all orders for this visit:    Postpartum follow-up     (spontaneous vaginal delivery)        Plan  Patient Instructions   Call with needs or concerns  Annual GYN exam is due after 2024  Return in about 10 months (around 2024) for Annual GYN exam, after 2024. Pt verbalized understanding of all discussed.     VIKA Jean

## 2023-12-12 ENCOUNTER — HOSPITAL ENCOUNTER (EMERGENCY)
Facility: HOSPITAL | Age: 23
Discharge: HOME | End: 2023-12-12
Attending: INTERNAL MEDICINE

## 2023-12-12 VITALS
SYSTOLIC BLOOD PRESSURE: 155 MMHG | RESPIRATION RATE: 20 BRPM | OXYGEN SATURATION: 100 % | WEIGHT: 123.2 LBS | BODY MASS INDEX: 21.15 KG/M2 | TEMPERATURE: 98.7 F | HEART RATE: 87 BPM | DIASTOLIC BLOOD PRESSURE: 79 MMHG

## 2023-12-12 DIAGNOSIS — B34.9 ACUTE VIRAL SYNDROME: Primary | ICD-10-CM

## 2023-12-12 PROCEDURE — 99284 EMERGENCY DEPT VISIT MOD MDM: CPT | Performed by: INTERNAL MEDICINE

## 2023-12-12 PROCEDURE — 99282 EMERGENCY DEPT VISIT SF MDM: CPT

## 2023-12-12 RX ORDER — BENZONATATE 100 MG/1
100 CAPSULE ORAL EVERY 8 HOURS
Qty: 21 CAPSULE | Refills: 0 | Status: SHIPPED | OUTPATIENT
Start: 2023-12-12

## 2023-12-12 RX ORDER — NAPROXEN 500 MG/1
500 TABLET ORAL 2 TIMES DAILY WITH MEALS
Qty: 30 TABLET | Refills: 0 | Status: SHIPPED | OUTPATIENT
Start: 2023-12-12

## 2023-12-12 RX ORDER — FLUTICASONE PROPIONATE 50 MCG
1 SPRAY, SUSPENSION (ML) NASAL DAILY
Qty: 16 G | Refills: 0 | Status: SHIPPED | OUTPATIENT
Start: 2023-12-12

## 2023-12-12 NOTE — ED PROVIDER NOTES
HPI: Patient is a 23 y.o. female who presents with 5 days of cough, myalgias, and congestion  which the patient describes at mild The patient has had contact with people with similar symptoms.  The patient took tylenol without relief of symptoms.    No Known Allergies    Past Medical History:   Diagnosis Date    Anemia       History reviewed. No pertinent surgical history.  Social History     Tobacco Use    Smoking status: Never    Smokeless tobacco: Never   Vaping Use    Vaping Use: Never used   Substance Use Topics    Alcohol use: Not Currently     Comment: socially    Drug use: Never       Nursing notes reviewed  Physical Exam:  ED Triage Vitals [12/12/23 1105]   Temperature Pulse Respirations Blood Pressure SpO2   98.7 °F (37.1 °C) 87 20 155/79 100 %      Temp Source Heart Rate Source Patient Position - Orthostatic VS BP Location FiO2 (%)   Oral Monitor Sitting Left arm --      Pain Score       --           ROS: Positive for cough, myalgias, and congestion, the remainder of a 10 organ system ROS was otherwise unremarkable.  General: awake, alert, no acute distress    Head: normocephalic, atraumatic    Eyes: no scleral icterus  Ears: external ears normal, hearing grossly intact  Nose: external exam grossly normal, positive nasal discharge  Neck: symmetric, No JVD noted, trachea midline  Pulmonary: no respiratory distress, no tachypnea noted  Cardiovascular: appears well perfused  Abdomen: no distention noted  Musculoskeletal: no deformities noted, tone normal  Neuro: grossly non-focal  Psych: mood and affect appropriate    The patient is stable and has a history and physical exam consistent with a viral illness. COVID19 testing has not been performed.  I considered the patient's other medical conditions as applicable/noted above in my medical decision making.  The patient is stable upon discharge. The plan is for supportive care at home.    The patient (and any family present) verbalized understanding of the  discharge instructions and warnings that would necessitate return to the Emergency Department.  All questions were answered prior to discharge.    Medications - No data to display  Final diagnoses:   Acute viral syndrome     Time reflects when diagnosis was documented in both MDM as applicable and the Disposition within this note       Time User Action Codes Description Comment    12/12/2023 11:26 AM Esther Lobo [B34.9] Acute viral syndrome           ED Disposition       ED Disposition   Discharge    Condition   Stable    Date/Time   Tue Dec 12, 2023 11:26 AM    Comment   Reema Rodarte discharge to home/self care.                   Follow-up Information    None       Patient's Medications   Discharge Prescriptions    BENZONATATE (TESSALON PERLES) 100 MG CAPSULE    Take 1 capsule (100 mg total) by mouth every 8 (eight) hours       Start Date: 12/12/2023End Date: --       Order Dose: 100 mg       Quantity: 21 capsule    Refills: 0    FLUTICASONE (FLONASE) 50 MCG/ACT NASAL SPRAY    1 spray into each nostril daily       Start Date: 12/12/2023End Date: --       Order Dose: 1 spray       Quantity: 16 g    Refills: 0    MENTHOL-CETYLPYRIDINIUM (CEPACOL) 3 MG LOZENGE    Take 1 lozenge (3 mg total) by mouth as needed for sore throat       Start Date: 12/12/2023End Date: --       Order Dose: 3 mg       Quantity: 10 lozenge    Refills: 0    NAPROXEN (NAPROSYN) 500 MG TABLET    Take 1 tablet (500 mg total) by mouth 2 (two) times a day with meals       Start Date: 12/12/2023End Date: --       Order Dose: 500 mg       Quantity: 30 tablet    Refills: 0     No discharge procedures on file.    Electronically Signed by       Esther Lobo MD  12/12/23 1128

## 2024-09-04 ENCOUNTER — TELEPHONE (OUTPATIENT)
Dept: OBGYN CLINIC | Facility: CLINIC | Age: 24
End: 2024-09-04